# Patient Record
Sex: MALE | Race: AMERICAN INDIAN OR ALASKA NATIVE
[De-identification: names, ages, dates, MRNs, and addresses within clinical notes are randomized per-mention and may not be internally consistent; named-entity substitution may affect disease eponyms.]

---

## 2018-01-01 ENCOUNTER — HOSPITAL ENCOUNTER (EMERGENCY)
Dept: HOSPITAL 43 - DL.ED | Age: 23
Discharge: HOME | End: 2018-01-01
Payer: COMMERCIAL

## 2018-01-01 VITALS — SYSTOLIC BLOOD PRESSURE: 145 MMHG | DIASTOLIC BLOOD PRESSURE: 86 MMHG

## 2018-01-01 DIAGNOSIS — F17.210: ICD-10-CM

## 2018-01-01 DIAGNOSIS — K04.7: ICD-10-CM

## 2018-01-01 DIAGNOSIS — K21.9: Primary | ICD-10-CM

## 2018-01-01 LAB
ANION GAP SERPL CALC-SCNC: 14.6 MMOL/L
CHLORIDE SERPL-SCNC: 101 MMOL/L (ref 101–111)
SODIUM SERPL-SCNC: 139 MMOL/L (ref 135–145)

## 2018-01-01 PROCEDURE — 84484 ASSAY OF TROPONIN QUANT: CPT

## 2018-01-01 PROCEDURE — 93005 ELECTROCARDIOGRAM TRACING: CPT

## 2018-01-01 PROCEDURE — 71045 X-RAY EXAM CHEST 1 VIEW: CPT

## 2018-01-01 PROCEDURE — 36415 COLL VENOUS BLD VENIPUNCTURE: CPT

## 2018-01-01 PROCEDURE — 93010 ELECTROCARDIOGRAM REPORT: CPT

## 2018-01-01 PROCEDURE — 80305 DRUG TEST PRSMV DIR OPT OBS: CPT

## 2018-01-01 PROCEDURE — 80053 COMPREHEN METABOLIC PANEL: CPT

## 2018-01-01 PROCEDURE — 81001 URINALYSIS AUTO W/SCOPE: CPT

## 2018-01-01 PROCEDURE — 99284 EMERGENCY DEPT VISIT MOD MDM: CPT

## 2018-01-01 PROCEDURE — 85025 COMPLETE CBC W/AUTO DIFF WBC: CPT

## 2018-01-01 NOTE — EDM.PDOC
ED HPI GENERAL MEDICAL PROBLEM





- General


Chief Complaint: General


Stated Complaint: chest pain [id number]


Time Seen by Provider: 01/01/18 22:10


Source of Information: Reports: Patient


History Limitations: Reports: No Limitations





- History of Present Illness


INITIAL COMMENTS - FREE TEXT/NARRATIVE: 





This 21 yo male patient reports to the ED with right lower posterior dental 

pain and right sided chest pain. The patient reports his dental pain has been 

present for about 1 month, but his chest pain started 3 days ago. The patient 

reports he has not been seen by a dentist or by a primary care provider for 

either of his current symptoms. The patient reports he has been taking Tylenol 

and ibuprofen for his pain. The patient report she has taken Tums in the past 

for similar chest pains which relieved his symptoms. The patient also reports 

he has been noticing sum purulent drainage from his right posterior tooth 

intermittently over the past month with the last drainage noticed earlier 

today. 


Onset: Gradual


Duration: Week(s):, Constant, Getting Worse


Location: Reports: Face (right lower posterior tooth), Chest (left upper chest 

pain)


Quality: Reports: Ache, Dull


Severity: Moderate


Improves with: Reports: Medication (Tylenol and ibuprofen)


Worsens with: Reports: Other (lying down)


Associated Symptoms: Reports: Chest Pain, Other (dental pain)


Treatments PTA: Reports: Acetaminophen, NSAIDS





- Related Data


 Allergies











Allergy/AdvReac Type Severity Reaction Status Date / Time


 


No Known Allergies Allergy   Verified 01/01/18 22:21











Home Meds: 


 Home Meds





. [No Known Home Meds]  06/04/16 [History]











Past Medical History





- Past Health History


Medical/Surgical History: Denies Medical/Surgical History





- Infectious Disease History


Infectious Disease History: Reports: None





Social & Family History





- Family History


Family Medical History: Noncontributory





- Tobacco Use


Smoking Status *Q: Current Every Day Smoker


Years of Tobacco use: 3


Packs/Tins Daily: 0.1


Used Tobacco, but Quit: No





- Caffeine Use


Caffeine Use: Reports: Coffee, Soda





- Recreational Drug Use


Recreational Drug Use: No


Drug Use in Last 12 Months: Yes


Recreational Drug Type: Reports: Marijuana/Hashish


Recreational Drug Use Frequency: Patient Refuses To Answer





ED ROS GENERAL





- Review of Systems


Review Of Systems: ROS reveals no pertinent complaints other than HPI.





ED EXAM, GENERAL





- Physical Exam


Exam: See Below


Exam Limited By: No Limitations


General Appearance: Alert, WD/WN, Mild Distress, Thin


Eye Exam: Bilateral Eye: EOMI, Normal Inspection, PERRL


Ears: Normal External Exam, Normal Canal, Hearing Grossly Normal, Normal TMs


Nose: Normal Inspection, Normal Mucosa, No Blood


Throat/Mouth: Normal Inspection, Normal Lips, Normal Gums, Normal Oropharynx, 

Normal Voice, No Airway Compromise, Other ([place] tooth right lower jaw has 

some slight erythema near the gum line with no obvious drainage. )


Head: Atraumatic, Normocephalic


Neck: Normal Inspection, Supple, Non-Tender, Full Range of Motion


Respiratory/Chest: No Respiratory Distress, Lungs Clear, Normal Breath Sounds, 

No Accessory Muscle Use, Chest Non-Tender


Cardiovascular: Normal Peripheral Pulses, Regular Rate, Rhythm, No Edema, No 

Gallop, No JVD, No Murmur, No Rub


GI/Abdominal: Normal Bowel Sounds, Soft, Non-Tender, No Organomegaly, No 

Distention, No Abnormal Bruit, No Mass


 (Male) Exam: Deferred


Rectal (Males) Exam: Deferred


Back Exam: Normal Inspection, Full Range of Motion, NT


Extremities: Normal Inspection, Normal Range of Motion, Non-Tender, Normal 

Capillary Refill, No Pedal Edema


Neurological: Alert, Oriented, CN II-XII Intact, Normal Cognition, Normal Gait, 

Normal Reflexes, No Motor/Sensory Deficits


Psychiatric: Normal Affect, Normal Mood


Skin Exam: Warm, Dry, Intact, Normal Color, No Rash


Lymphatic: No Adenopathy





Course





- Vital Signs


Last Recorded V/S: 


 Last Vital Signs











Temp  37.6 C   01/01/18 21:56


 


Pulse  74   01/01/18 21:56


 


Resp  16   01/01/18 21:56


 


BP  145/86 H  01/01/18 21:56


 


Pulse Ox  99   01/01/18 21:56














- Orders/Labs/Meds


Orders: 


 Active Orders 24 hr











 Category Date Time Status


 


 EKG Documentation Completion [RC] URGENT Care  01/01/18 22:14 Active











Labs: 


 Laboratory Tests











  01/01/18 01/01/18 Range/Units





  22:25 22:25 


 


WBC  7.8   (5.0-10.0)  10^3/uL


 


RBC  5.43   (4.6-6.2)  10^6/uL


 


Hgb  16.2   (14.0-18.0)  g/dL


 


Hct  47.5   (40.0-54.0)  %


 


MCV  87.5   ()  fL


 


MCH  29.8   (27.0-34.0)  pg


 


MCHC  34.1   (33.0-35.0)  g/dL


 


Plt Count  172   (150-450)  10^3/uL


 


Neut % (Auto)  72.6   (42.2-75.2)  %


 


Lymph % (Auto)  18.7 L   (20.5-50.1)  %


 


Mono % (Auto)  6.8   (2-8)  %


 


Eos % (Auto)  0.9 L   (1.0-3.0)  %


 


Baso % (Auto)  1.0   (0.0-1.0)  %


 


Sodium   139  (135-145)  mmol/L


 


Potassium   3.6  (3.6-5.0)  mmol/L


 


Chloride   101  (101-111)  mmol/L


 


Carbon Dioxide   27.0  (21.0-31.0)  mmol/L


 


Anion Gap   14.6  


 


BUN   13  (7-18)  mg/dL


 


Creatinine   0.9  (0.6-1.3)  mg/dL


 


Est Cr Clr Drug Dosing   137.44  mL/min


 


Estimated GFR (MDRD)   > 60  


 


BUN/Creatinine Ratio   14.44  


 


Glucose   99  ()  mg/dL


 


Calcium   9.9  (8.4-10.2)  mg/dl


 


Total Bilirubin   1.1 H  (0.2-1.0)  mg/dL


 


AST   24  (10-42)  IU/L


 


ALT   20  (10-60)  IU/L


 


Alkaline Phosphatase   64  ()  IU/L


 


Troponin I   < 0.02  (0.00-0.02)  ng/ml


 


Total Protein   8.1  (6.7-8.2)  g/dl


 


Albumin   5.2  (3.2-5.5)  g/dl


 


Globulin   2.9  


 


Albumin/Globulin Ratio   1.79  











Meds: 


Medications














Discontinued Medications














Generic Name Dose Route Start Last Admin





  Trade Name Freq  PRN Reason Stop Dose Admin


 


Al Hydroxide/Mg Hydroxide  30 ml  01/01/18 23:02  





  Gi Cocktail  PO  01/01/18 23:03  





  ONETIME ONE   


 


Clindamycin HCl  300 mg  01/01/18 23:02  





  Cleocin  PO  01/01/18 23:03  





  ONETIME ONE   














- Re-Assessments/Exams


Free Text/Narrative Re-Assessment/Exam: 





01/01/18 23:11


The patient was advised of the lab, EKG and x-ray results. 





Departure





- Departure


Time of Disposition: 23:38


Disposition: Home, Self-Care 01


Condition: Fair


Clinical Impression: 


 Dental abscess





GERD (gastroesophageal reflux disease)


Qualifiers:


 Esophagitis presence: esophagitis presence not specified Qualified Code(s): 

K21.9 - Gastro-esophageal reflux disease without esophagitis








- Discharge Information


Instructions:  Dental Abscess, Easy-to-Read, Gastroesophageal Reflux Disease, 

Adult


Forms:  ED Department Discharge


Care Plan Goals: 


The patient was advised of the examination, EKG and lab results during the 

visit. The patient was given an oral dose of Clindamycin and a GI cocktail 

while in the ED. The patient was discharged with a script for Clindamycin (300 

mg) #40 to take 1 by mouth 4 times per day for 10 days and Omeprazole (20 mg) #

30 to take 1 by mouth daily. If the patient has any additional symptoms or 

concerns, the patient should follow-up with his primary care facility or return 

to the emergency department. 





- My Orders


Last 24 Hours: 


My Active Orders





01/01/18 22:14


EKG Documentation Completion [RC] URGENT 














- Assessment/Plan


Last 24 Hours: 


My Active Orders





01/01/18 22:14


EKG Documentation Completion [RC] URGENT

## 2018-01-03 NOTE — EKG
01/01/2018 - JAIRO BRASWELL -

 

This 12-lead EKG shows normal sinus rhythm with a ventricular rate of 64.

Normal axis.  Nonspecific intraventricular conduction delay.  No acute ST-

segment or T-wave changes.

 

DD:  01/03/2018 20:53:14

DT:  01/03/2018 21:09:23

[facility]

Job #:  [id number]

## 2018-02-12 ENCOUNTER — HOSPITAL ENCOUNTER (EMERGENCY)
Dept: HOSPITAL 43 - DL.ED | Age: 23
Discharge: HOME | End: 2018-02-12
Payer: SELF-PAY

## 2018-02-12 VITALS — SYSTOLIC BLOOD PRESSURE: 131 MMHG | DIASTOLIC BLOOD PRESSURE: 76 MMHG
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
## 2018-02-12 DIAGNOSIS — M54.16: ICD-10-CM

## 2018-02-12 DIAGNOSIS — S39.012A: Primary | ICD-10-CM

## 2018-02-12 PROCEDURE — 99283 EMERGENCY DEPT VISIT LOW MDM: CPT

## 2018-02-12 PROCEDURE — 96372 THER/PROPH/DIAG INJ SC/IM: CPT

## 2018-02-12 NOTE — EDM.PDOC
ED HPI GENERAL MEDICAL PROBLEM





- General


Chief Complaint: Back Pain or Injury


Stated Complaint: LOWER BACK DOWN LEG, PAIN,NUMB


Time Seen by Provider: 02/12/18 22:59


Source of Information: Reports: Patient, RN, RN Notes Reviewed


History Limitations: Reports: No Limitations





- History of Present Illness


INITIAL COMMENTS - FREE TEXT/NARRATIVE: 


Pt presents to the ER with c/o right lower back pain which began 2-3 days ago 

after doing exercises. He denies any falls or trauma. He denies saddle 

anesthesia. He states he has shooting, stabbing pain down the right leg. He 

also admits to numbness and tingling at times down the right leg. 


Onset: Gradual


Treatments PTA: Reports: Acetaminophen


  ** Right Lower Back


Pain Score (Numeric/FACES): 7





- Related Data


 Allergies











Allergy/AdvReac Type Severity Reaction Status Date / Time


 


No Known Allergies Allergy   Verified 02/12/18 20:50











Home Meds: 


 Home Meds





. [No Known Home Meds]  06/04/16 [History]











Past Medical History





- Past Health History


Medical/Surgical History: Denies Medical/Surgical History


Psychiatric History: Reports: None





- Infectious Disease History


Infectious Disease History: Reports: None





Social & Family History





- Family History


Family Medical History: Noncontributory





- Tobacco Use


Smoking Status *Q: Unknown Ever Smoked


Years of Tobacco use: 3


Packs/Tins Daily: 0.1


Used Tobacco, but Quit: No





- Caffeine Use


Caffeine Use: Reports: Coffee, Soda





- Recreational Drug Use


Recreational Drug Use: No


Drug Use in Last 12 Months: Yes


Recreational Drug Type: Reports: Marijuana/Hashish


Recreational Drug Use Frequency: Patient Refuses To Answer





ED ROS GENERAL





- Review of Systems


Review Of Systems: ROS reveals no pertinent complaints other than HPI.





ED EXAM,LOWER BACK PAIN/INJURY





- Physical Exam


Exam: See Below


Exam Limited By: No Limitations


General Appearance: Alert, WD/WN, No Apparent Distress


Eye Exam: Bilateral Eye: EOMI, Normal Inspection, PERRL


Ears: Normal External Exam, Hearing Grossly Normal


Nose: Normal Inspection


Throat/Mouth: Normal Inspection, Normal Voice, No Airway Compromise


Head: Atraumatic, Normocephalic


Neck: Normal Inspection, Supple, Non-Tender, Full Range of Motion


Respiratory/Chest: No Respiratory Distress, Lungs Clear, Normal Breath Sounds, 

No Accessory Muscle Use, Chest Non-Tender


Cardiovascular: Normal Peripheral Pulses, Regular Rate, Rhythm, No Edema, No 

Gallop, No JVD, No Murmur, No Rub


GI/Abdominal: Normal Bowel Sounds, Soft, Non-Tender, No Organomegaly, No 

Distention, No Abnormal Bruit, No Mass


 (Male) Exam: Deferred


Rectal (Males) Exam: Deferred


Back Exam: Normal Inspection, Full Range of Motion, Muscle Spasm.  No: CVA 

Tenderness (L), CVA Tenderness (R), Paraspinal Tenderness, Vertebral Tenderness


Extremities: Normal Inspection, Limited Range of Motion (right leg)


Neurological: Alert, Normal Mood/Affect, No Motor/Sensory Deficits, Oriented x 3


Psychiatric: Normal Affect, Normal Mood


Skin Exam: Warm, Dry, Intact, Normal Color, No Rash


Lymphatic: No Adenopathy





Course





- Vital Signs


Last Recorded V/S: 


 Last Vital Signs











Temp  98.5 F   02/12/18 20:53


 


Pulse  75   02/12/18 20:53


 


Resp  16   02/12/18 20:53


 


BP  131/76   02/12/18 20:53


 


Pulse Ox  75 L  02/12/18 20:53














- Orders/Labs/Meds


Meds: 


Medications














Discontinued Medications














Generic Name Dose Route Start Last Admin





  Trade Name Jedq  PRN Reason Stop Dose Admin


 


Ketorolac Tromethamine  60 mg  02/12/18 23:05  02/12/18 23:16





  Toradol  IM  02/12/18 23:06  60 mg





  ONETIME ONE   Administration


 


Methylprednisolone Sodium Succinate  125 mg  02/12/18 23:05  02/12/18 23:16





  Solu-Medrol  IM  02/12/18 23:06  125 mg





  ONETIME ONE   Administration


 


Orphenadrine Citrate  60 mg  02/12/18 23:15  02/12/18 23:16





  Norflex  IM   60 mg





  Q12H KENIA   Administration














Departure





- Departure


Time of Disposition: 23:08


Disposition: Home, Self-Care 01


Condition: Fair


Clinical Impression: 


 Lumbar radiculopathy, acute, Muscle strain








- Discharge Information


Instructions:  Back Injury Prevention, Easy-to-Read, Muscle Strain, Easy-to-Read

, Back Pain, Adult, Easy-to-Read


Forms:  ED Department Discharge


Additional Instructions: 


RX: Prednisone, Norflex, Diclofenac


Rest, ice to area as tolerated


Follow up with your primary care facility